# Patient Record
Sex: MALE | Race: OTHER | HISPANIC OR LATINO | URBAN - METROPOLITAN AREA
[De-identification: names, ages, dates, MRNs, and addresses within clinical notes are randomized per-mention and may not be internally consistent; named-entity substitution may affect disease eponyms.]

---

## 2017-02-19 ENCOUNTER — EMERGENCY (EMERGENCY)
Facility: HOSPITAL | Age: 47
LOS: 1 days | Discharge: HOME | End: 2017-02-19
Attending: EMERGENCY MEDICINE
Payer: SELF-PAY

## 2017-02-19 VITALS
HEIGHT: 66 IN | OXYGEN SATURATION: 100 % | HEART RATE: 75 BPM | RESPIRATION RATE: 16 BRPM | DIASTOLIC BLOOD PRESSURE: 85 MMHG | SYSTOLIC BLOOD PRESSURE: 134 MMHG | WEIGHT: 169.98 LBS | TEMPERATURE: 98 F

## 2017-02-19 DIAGNOSIS — J01.00 ACUTE MAXILLARY SINUSITIS, UNSPECIFIED: ICD-10-CM

## 2017-02-19 DIAGNOSIS — Z88.2 ALLERGY STATUS TO SULFONAMIDES: ICD-10-CM

## 2017-02-19 PROCEDURE — 99282 EMERGENCY DEPT VISIT SF MDM: CPT

## 2017-02-19 PROCEDURE — 99283 EMERGENCY DEPT VISIT LOW MDM: CPT

## 2017-02-19 RX ORDER — PSEUDOEPHEDRINE HCL 30 MG
1 TABLET ORAL
Qty: 20 | Refills: 0 | OUTPATIENT
Start: 2017-02-19

## 2017-02-19 RX ORDER — FLUTICASONE PROPIONATE 50 MCG
1 SPRAY, SUSPENSION NASAL
Qty: 1 | Refills: 0 | OUTPATIENT
Start: 2017-02-19

## 2017-02-19 NOTE — ED PROVIDER NOTE - OBJECTIVE STATEMENT
47 y/o M pt w/ no significant PMHx presents to the ED c/o sinus pressure and pain x 1 week. Pt reports that he has experienced similar sinus pressure in the past, but that the presenting pain is slightly worse and radiates to the jaw and temple area. Pt has also noted mild, intermittent tingling in the sinus area. Pt states taking over the counter sinus medication w/ mild relief. Pt denies fever, chills, nasal congestion, nasal drainage, throat pain, ear pain, SOB, cough, or any other complaints. Allergies: sulfa drugs (hives)

## 2017-02-19 NOTE — ED PROVIDER NOTE - MEDICAL DECISION MAKING DETAILS
Patient with sinusitis, will treat with steroid nasal spray and decongestant. To f/u PMDin 1 week. Return to the ED immediately if getting worse, not improving, or if having any new or troubling symptoms.

## 2017-02-19 NOTE — ED PROVIDER NOTE - CHPI ED SYMPTOMS NEG
no fever/no nasal congestion, no nasal drainage, no throat pain, no ear pain, no shortness of breath/no cough/no chills

## 2019-04-05 ENCOUNTER — EMERGENCY (EMERGENCY)
Facility: HOSPITAL | Age: 49
LOS: 1 days | Discharge: HOME | End: 2019-04-05
Attending: EMERGENCY MEDICINE
Payer: SELF-PAY

## 2019-04-05 VITALS — WEIGHT: 175.05 LBS | HEIGHT: 65 IN

## 2019-04-05 PROCEDURE — 93971 EXTREMITY STUDY: CPT | Mod: 26,RT

## 2019-04-05 PROCEDURE — 99284 EMERGENCY DEPT VISIT MOD MDM: CPT

## 2019-04-05 PROCEDURE — 99284 EMERGENCY DEPT VISIT MOD MDM: CPT | Mod: 25

## 2019-04-05 PROCEDURE — 93971 EXTREMITY STUDY: CPT

## 2019-04-05 RX ORDER — CEPHALEXIN 500 MG
500 CAPSULE ORAL ONCE
Qty: 0 | Refills: 0 | Status: COMPLETED | OUTPATIENT
Start: 2019-04-05 | End: 2019-04-05

## 2019-04-05 RX ORDER — CEPHALEXIN 500 MG
1 CAPSULE ORAL
Qty: 21 | Refills: 0 | OUTPATIENT
Start: 2019-04-05 | End: 2019-04-11

## 2019-04-05 NOTE — ED PROVIDER NOTE - NSFOLLOWUPINSTRUCTIONS_ED_ALL_ED_FT
Take meds as prescribed.  Take Tylenol/Ibuprofen as needed for pains and/or fevers.  Drink plenty of fluids.  Follow up with your doctor or in the Clinic as discussed within 2 days.  Return to the ER for any concerns.

## 2019-04-05 NOTE — ED ADULT NURSE NOTE - CHPI ED NUR SYMPTOMS NEG
no tingling/no weakness/no decreased eating/drinking/no dizziness/no chills/no vomiting/no fever/no nausea

## 2019-04-05 NOTE — ED ADULT NURSE NOTE - OBJECTIVE STATEMENT
Patient present to Ed with c/o right foot redness, swelling and tenderness since wednesday. No c/o pain but tenderness to right foot and redness noted. Patient took ibuprofen last night and stated he had relief

## 2019-04-05 NOTE — ED PROVIDER NOTE - CLINICAL SUMMARY MEDICAL DECISION MAKING FREE TEXT BOX
49 y/o M presents with possible DVT though low suspicion given pretest probability. Possible initial cellulitis; if DVT study is negative, likely discharge with Keflex and PCP follow up.

## 2019-04-05 NOTE — ED PROVIDER NOTE - OBJECTIVE STATEMENT
47 y/o M with no significant PMHx or PSHx presents to the ED with complaints of swelling to R leg x 3 days. Patient reports recent drive from [place] for 16 hours; patient states he stopped approximately 6 times and noticed swelling to leg the following day. Patient also with complaints of mild tightness to R calf. Denies chest pain, palpitations, shortness of breath, dizziness or any other acute complaints.

## 2019-04-05 NOTE — ED ADULT NURSE NOTE - NSIMPLEMENTINTERV_GEN_ALL_ED
Implemented All Fall with Harm Risk Interventions:  [place] to call system. Call bell, personal items and telephone within reach. Instruct patient to call for assistance. Room bathroom lighting operational. Non-slip footwear when patient is off stretcher. Physically safe environment: no spills, clutter or unnecessary equipment. Stretcher in lowest position, wheels locked, appropriate side rails in place. Provide visual cue, wrist band, yellow gown, etc. Monitor gait and stability. Monitor for mental status changes and reorient to person, place, and time. Review medications for side effects contributing to fall risk. Reinforce activity limits and safety measures with patient and family. Provide visual clues: red socks.

## 2019-04-05 NOTE — ED PROVIDER NOTE - PHYSICAL EXAMINATION
2+ non-pitting edema diffuse to R foot. Approximately 2 cm area of erythema. No increased warmth. No tenderness to palpation. Full ROM. Neurovascularly intact.

## 2019-04-05 NOTE — ED PROVIDER NOTE - NSFOLLOWUPCLINICS_GEN_ALL_ED_FT
[facility]  [facility]  [address] - [address] [place] [address]  Phone: [phone]  Fax: [phone]  Follow Up Time:

## 2020-02-13 ENCOUNTER — EMERGENCY (EMERGENCY)
Facility: HOSPITAL | Age: 50
LOS: 1 days | Discharge: HOME | End: 2020-02-13
Attending: EMERGENCY MEDICINE
Payer: SELF-PAY

## 2020-02-13 VITALS
RESPIRATION RATE: 20 BRPM | HEART RATE: 106 BPM | SYSTOLIC BLOOD PRESSURE: 151 MMHG | OXYGEN SATURATION: 99 % | HEIGHT: 66 IN | DIASTOLIC BLOOD PRESSURE: 101 MMHG | TEMPERATURE: 98 F | WEIGHT: 175.05 LBS

## 2020-02-13 PROCEDURE — 99283 EMERGENCY DEPT VISIT LOW MDM: CPT

## 2020-02-13 RX ORDER — DEXTROMETHORPHAN HYDROBROMIDE AND PROMETHAZINE HYDROCHLORIDE 15; 6.25 MG/5ML; MG/5ML
5 SYRUP ORAL
Qty: 140 | Refills: 0
Start: 2020-02-13 | End: 2020-02-19

## 2020-02-13 NOTE — ED PROVIDER NOTE - PATIENT PORTAL LINK FT
You can access the FollowMyHealth Patient Portal offered by [facility] by registering at the following website: [email or web address]. By joining [facility]’s FollowMyHealth portal, you will also be able to view your health information using other applications (apps) compatible with our system.

## 2020-02-13 NOTE — ED PROVIDER NOTE - OBJECTIVE STATEMENT
50 y/o M patient with no significant PMHx and no significant PSHx presents to the ED with cold symptoms. 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 Patient says he began developing a cough x2 days ago and gradually began experiencing body aches. Patient presents to the ED and states he is concerned he may be developing the flu. Patient notes he took Tylenol to minimal relief. Patient denies any other complaints. NKDA.

## 2021-12-16 NOTE — ED PROVIDER NOTE - NS ED NOTE AC HIGH RISK COUNTRIES
Nebulizer Treatment:  Pre treatment vitals: Heart Rate: 80 (12/16/21 0815)  SpO2: 98 % (12/16/21 0815)     Post HR 87    SPO2  98      Administered Hand held nebulizer treatment with Albuterol    Medication Supply: Stock Medication used      Treatment vitals and times recorded in vitals section  Patient tolerated the procedure well.    No

## 2023-10-13 NOTE — ED ADULT NURSE NOTE - FALL HARM RISK TYPE OF ASSESSMENT
CC:   Chief Complaint   Patient presents with   • Sore Throat     X2-3 days sore throat, redness with white spots, headaches and intermittent painful ears and stomach. Fever NOC.        New Patient     SUBJECTIVE  HPI:Erasto Holloway is a 12 year old male who presents today with     Sore Throat  This is a new problem. Episode onset: x 3 days. Associated symptoms include a fever and headaches.       His past medical history is significant for:  No past medical history on file.    Allergies: ALLERGIES:  No Known Allergies    No current outpatient medications on file.     No current facility-administered medications for this visit.         Review of Systems:  Review of Systems   Constitutional: Positive for fever.   Neurological: Positive for headaches.   All other systems reviewed and are negative.      All other systems reviewed are negative    OBJECTIVE  Vitals:   Visit Vitals  Pulse 100   Temp 99.3 °F (37.4 °C) (Tympanic)   Resp (!) 18   Wt 31.8 kg (70 lb)   SpO2 98%       Physical Exam:  Physical Exam  Vitals and nursing note reviewed.   Constitutional:       Appearance: He is well-developed.   HENT:      Right Ear: Tympanic membrane normal.      Left Ear: Tympanic membrane normal.      Mouth/Throat:      Mouth: Mucous membranes are moist.      Pharynx: Posterior oropharyngeal erythema present.      Neck: Normal range of motion and neck supple. No rigidity.   Eyes:      Conjunctiva/sclera: Conjunctivae normal.   Cardiovascular:      Rate and Rhythm: Normal rate and regular rhythm.      Heart sounds: S1 normal and S2 normal.   Pulmonary:      Effort: Pulmonary effort is normal.      Breath sounds: Normal breath sounds and air entry.   Neurological:      Mental Status: He is alert.       Results for orders placed or performed in visit on 10/13/23   POCT Rapid strep A   Result Value    GRP A STREP Positive (A)    Internal Procedural Controls Acceptable Yes    TEST LOT NUMBER [id number]    TEST LOT EXPIRATION DATE  10/31/2024         ASSESSMENT/PLAN  Strep pharyngitis-rapid strep test is positive  I had a discussion with the patient about this condition.  Discussed typical cause, course of illness and prognosis.  Medication as ordered.  Should expect improvement over the next 48-72 hours and resolution within the next week.  If not or if worse seek immediate reevaluation in [facility] or with patient's pcp.  Patient verbalized understanding.    If symptoms should suddenly change or worsen, seek immediate reevaluation with PCP or return to [facility]mediate [facility].  The patient verbalized understanding.    Frantz Fink MD  10/13/2023       Daily Assessment